# Patient Record
Sex: FEMALE
[De-identification: names, ages, dates, MRNs, and addresses within clinical notes are randomized per-mention and may not be internally consistent; named-entity substitution may affect disease eponyms.]

---

## 2023-10-31 ENCOUNTER — NON-APPOINTMENT (OUTPATIENT)
Age: 73
End: 2023-10-31

## 2023-10-31 ENCOUNTER — APPOINTMENT (OUTPATIENT)
Dept: OPHTHALMOLOGY | Facility: CLINIC | Age: 73
End: 2023-10-31
Payer: MEDICARE

## 2023-10-31 PROCEDURE — 99203 OFFICE O/P NEW LOW 30 MIN: CPT

## 2024-09-03 ENCOUNTER — APPOINTMENT (OUTPATIENT)
Dept: ORTHOPEDIC SURGERY | Facility: CLINIC | Age: 74
End: 2024-09-03

## 2024-09-03 VITALS — HEIGHT: 66 IN | WEIGHT: 132 LBS | BODY MASS INDEX: 21.21 KG/M2

## 2024-09-03 DIAGNOSIS — M70.21 OLECRANON BURSITIS, RIGHT ELBOW: ICD-10-CM

## 2024-09-03 PROCEDURE — 20610 DRAIN/INJ JOINT/BURSA W/O US: CPT | Mod: RT

## 2024-09-03 PROCEDURE — 73080 X-RAY EXAM OF ELBOW: CPT | Mod: RT

## 2024-09-03 PROCEDURE — 99204 OFFICE O/P NEW MOD 45 MIN: CPT | Mod: 25

## 2024-09-13 PROBLEM — M70.21 OLECRANON BURSITIS OF RIGHT ELBOW: Status: ACTIVE | Noted: 2024-09-13

## 2024-09-13 NOTE — HISTORY OF PRESENT ILLNESS
[de-identified] : 74 year old RHD female presents today with posterior elbow swelling. No injury reported. Patient woke up with swelling in her elbow. She was seen by Urgent care and dx with olecranon bursitis. Denies pain.   The patient's past medical history, past surgical history, medications and allergies were reviewed by me today with the patient and documented accordingly. In addition, the patient's family and social history, which were noncontributory to this visit were reviewed also.

## 2024-09-13 NOTE — PHYSICAL EXAM
[de-identified] : Right elbow exam  Skin: Clean, dry, intact. No ecchymosis. No swelling. + palpable bursitis ROM:  full supination/pronation.   Painful ROM: None Tenderness: No medial epicondyle pain. No lateral epicondyle pain. No olecranon pain. No pain at radial head. Strength: 5/5 elbow flexion, 5/5 elbow extension, 5/5 supination, 5/5 pronation Stability: Stable to vaus/valgus stress Vasc: 2+ radial pulse, <2s cap refill Sensation: In tact to light touch throughout Neuro: Negative tinels at ulnar canal, AIN/PIN/Ulnar nerve in tact to motor/sensation. [de-identified] : Outside Images below were independently interpreted by Dr. Scruggs:   3 views of the right elbow dated 08/21/2024 showed no evidence of bony injury, or jose antonio dislocation. There is no underlying degenerative arthritic change seen. Overall alignment is maintained. Olecranon enthesophyte

## 2024-09-13 NOTE — ADDENDUM
[FreeTextEntry1] : This note was written by Maureen Browne on 09/03/2024 acting solely as a scribe for Dr. Dc Scruggs.  All medical record entries made by the Scribe were at my, Dr. Dc Scruggs, direction and personally dictated by me on 09/03/2024. I have personally reviewed the chart and agree that the record accurately reflects my personal performance of the history, physical exam, assessment and plan.

## 2024-09-13 NOTE — DISCUSSION/SUMMARY
[de-identified] : 73 y/o female with right elbow olecranon bursitis.   I discussed with the patient the treatment of olecranon bursitis. Discussed conservative management with aspiration of the olecranon bursa in an attempt to improve swelling acutely. Discussed risk of failure as well as possible risk of infection. Patient wished to proceed, and therapeutic aspiration performed today. Recommendations are for conservative management as below.    Recommendation: Conservative care & observation, avoidance of local pressure.  Elbow pads.  Ice/NSAIDs as needed.  We will see the patient back as needed.

## 2024-09-13 NOTE — HISTORY OF PRESENT ILLNESS
[de-identified] : 74 year old RHD female presents today with posterior elbow swelling. No injury reported. Patient woke up with swelling in her elbow. She was seen by Urgent care and dx with olecranon bursitis. Denies pain.   The patient's past medical history, past surgical history, medications and allergies were reviewed by me today with the patient and documented accordingly. In addition, the patient's family and social history, which were noncontributory to this visit were reviewed also.

## 2024-09-13 NOTE — DISCUSSION/SUMMARY
[de-identified] : 73 y/o female with right elbow olecranon bursitis.   I discussed with the patient the treatment of olecranon bursitis. Discussed conservative management with aspiration of the olecranon bursa in an attempt to improve swelling acutely. Discussed risk of failure as well as possible risk of infection. Patient wished to proceed, and therapeutic aspiration performed today. Recommendations are for conservative management as below.    Recommendation: Conservative care & observation, avoidance of local pressure.  Elbow pads.  Ice/NSAIDs as needed.  We will see the patient back as needed.

## 2024-09-13 NOTE — PHYSICAL EXAM
[de-identified] : Right elbow exam  Skin: Clean, dry, intact. No ecchymosis. No swelling. + palpable bursitis ROM:  full supination/pronation.   Painful ROM: None Tenderness: No medial epicondyle pain. No lateral epicondyle pain. No olecranon pain. No pain at radial head. Strength: 5/5 elbow flexion, 5/5 elbow extension, 5/5 supination, 5/5 pronation Stability: Stable to vaus/valgus stress Vasc: 2+ radial pulse, <2s cap refill Sensation: In tact to light touch throughout Neuro: Negative tinels at ulnar canal, AIN/PIN/Ulnar nerve in tact to motor/sensation. [de-identified] : Outside Images below were independently interpreted by Dr. Scruggs:   3 views of the right elbow dated 08/21/2024 showed no evidence of bony injury, or jose antonio dislocation. There is no underlying degenerative arthritic change seen. Overall alignment is maintained. Olecranon enthesophyte

## 2024-09-13 NOTE — PROCEDURE
[de-identified] : Aspiration: Right olecranon bursa  Indication: Olecranon bursitis.     A discussion was had with the patient regarding this procedure and all questions were answered. All risks, benefits and alternatives were discussed. These included but were not limited to bleeding, infection, and reaccumulation of fluid. Alcohol was used to clean the skin, and betadine was used to sterilize and prep the area in the posterior aspect of the right elbow. Ethyl chloride spray was then used as a topical anesthetic. An 18-gauge needle was used to aspirate the olecranon bursa and approximately 7 cc of inflammatory fluid was aspirated from the bursa without complication. A sterile compression bandage was then applied. The patient tolerated the procedure well.

## 2024-09-13 NOTE — PROCEDURE
[de-identified] : Aspiration: Right olecranon bursa  Indication: Olecranon bursitis.     A discussion was had with the patient regarding this procedure and all questions were answered. All risks, benefits and alternatives were discussed. These included but were not limited to bleeding, infection, and reaccumulation of fluid. Alcohol was used to clean the skin, and betadine was used to sterilize and prep the area in the posterior aspect of the right elbow. Ethyl chloride spray was then used as a topical anesthetic. An 18-gauge needle was used to aspirate the olecranon bursa and approximately 7 cc of inflammatory fluid was aspirated from the bursa without complication. A sterile compression bandage was then applied. The patient tolerated the procedure well.